# Patient Record
Sex: FEMALE | Race: WHITE | NOT HISPANIC OR LATINO | URBAN - METROPOLITAN AREA
[De-identification: names, ages, dates, MRNs, and addresses within clinical notes are randomized per-mention and may not be internally consistent; named-entity substitution may affect disease eponyms.]

---

## 2017-03-09 ENCOUNTER — AMBULATORY - HEALTHEAST (OUTPATIENT)
Dept: ALLERGY | Facility: CLINIC | Age: 72
End: 2017-03-09

## 2017-03-09 DIAGNOSIS — J30.9 ALLERGIC RHINITIS: ICD-10-CM

## 2017-03-20 ENCOUNTER — OFFICE VISIT - HEALTHEAST (OUTPATIENT)
Dept: ALLERGY | Facility: CLINIC | Age: 72
End: 2017-03-20

## 2017-03-20 DIAGNOSIS — J30.9 ALLERGIC RHINITIS, CAUSE UNSPECIFIED: ICD-10-CM

## 2017-03-20 DIAGNOSIS — J30.1 SEASONAL ALLERGIC RHINITIS DUE TO POLLEN: ICD-10-CM

## 2017-03-20 DIAGNOSIS — J30.81 ALLERGIC RHINITIS DUE TO ANIMAL (CAT) (DOG) HAIR AND DANDER: ICD-10-CM

## 2017-03-20 RX ORDER — OLOPATADINE HYDROCHLORIDE 1 MG/ML
SOLUTION/ DROPS OPHTHALMIC
Qty: 15 ML | Refills: 2 | Status: SHIPPED | OUTPATIENT
Start: 2017-03-20

## 2017-03-20 RX ORDER — AZELASTINE 1 MG/ML
2 SPRAY, METERED NASAL 2 TIMES DAILY
Qty: 90 ML | Refills: 3 | Status: SHIPPED | OUTPATIENT
Start: 2017-03-20

## 2017-03-20 RX ORDER — CETIRIZINE HYDROCHLORIDE 10 MG/1
10 TABLET ORAL DAILY PRN
Qty: 100 TABLET | Refills: 3 | Status: SHIPPED | OUTPATIENT
Start: 2017-03-20

## 2017-07-20 ENCOUNTER — AMBULATORY - HEALTHEAST (OUTPATIENT)
Dept: ALLERGY | Facility: CLINIC | Age: 72
End: 2017-07-20

## 2017-07-20 DIAGNOSIS — J30.81 ALLERGIC RHINITIS DUE TO ANIMAL (CAT) (DOG) HAIR AND DANDER: ICD-10-CM

## 2017-11-29 ENCOUNTER — AMBULATORY - HEALTHEAST (OUTPATIENT)
Dept: ALLERGY | Facility: CLINIC | Age: 72
End: 2017-11-29

## 2017-11-29 DIAGNOSIS — J30.81 ALLERGIC RHINITIS DUE TO ANIMAL HAIR AND DANDER: ICD-10-CM

## 2018-05-16 ENCOUNTER — AMBULATORY - HEALTHEAST (OUTPATIENT)
Dept: ALLERGY | Facility: CLINIC | Age: 73
End: 2018-05-16

## 2018-05-16 DIAGNOSIS — J30.2 CHRONIC SEASONAL ALLERGIC RHINITIS DUE TO OTHER ALLERGEN: ICD-10-CM

## 2018-05-30 ENCOUNTER — OFFICE VISIT - HEALTHEAST (OUTPATIENT)
Dept: ALLERGY | Facility: CLINIC | Age: 73
End: 2018-05-30

## 2018-10-04 ENCOUNTER — AMBULATORY - HEALTHEAST (OUTPATIENT)
Dept: ALLERGY | Facility: CLINIC | Age: 73
End: 2018-10-04

## 2018-10-04 DIAGNOSIS — J30.89 SEASONAL ALLERGIC RHINITIS DUE TO OTHER ALLERGIC TRIGGER: ICD-10-CM

## 2019-05-02 ENCOUNTER — AMBULATORY - HEALTHEAST (OUTPATIENT)
Dept: ALLERGY | Facility: CLINIC | Age: 74
End: 2019-05-02

## 2019-05-02 DIAGNOSIS — J30.89 SEASONAL ALLERGIC RHINITIS DUE TO OTHER ALLERGIC TRIGGER: ICD-10-CM

## 2019-05-06 ENCOUNTER — OFFICE VISIT - HEALTHEAST (OUTPATIENT)
Dept: ALLERGY | Facility: CLINIC | Age: 74
End: 2019-05-06

## 2019-05-06 ENCOUNTER — COMMUNICATION - HEALTHEAST (OUTPATIENT)
Dept: INTERNAL MEDICINE | Facility: CLINIC | Age: 74
End: 2019-05-06

## 2019-05-06 DIAGNOSIS — J30.89 SEASONAL ALLERGIC RHINITIS DUE TO OTHER ALLERGIC TRIGGER: ICD-10-CM

## 2020-01-28 ENCOUNTER — AMBULATORY - HEALTHEAST (OUTPATIENT)
Dept: ALLERGY | Facility: CLINIC | Age: 75
End: 2020-01-28

## 2020-01-28 DIAGNOSIS — J30.89 SEASONAL ALLERGIC RHINITIS DUE TO OTHER ALLERGIC TRIGGER: ICD-10-CM

## 2020-07-22 ENCOUNTER — COMMUNICATION - HEALTHEAST (OUTPATIENT)
Dept: ALLERGY | Facility: CLINIC | Age: 75
End: 2020-07-22

## 2021-05-28 NOTE — PROGRESS NOTES
Assessment:  Jazmín has allergic rhinitis with specific sensitivity to dust mite and mold  Nonallergic vasomotor rhinitis component.  On immunotherapy and tolerating well.  Rebound symptoms when trying to stretch out allergy shots.      Plan:  Continue immunotherapy prescription. 1:1 extract, 0.5 mL dosing.  Recommend trying to space out allergy shot as tolerated.  Nasacort nasal spray daily  Astelin 2 sprays daily.    Antihistamines when necessary  Follow-up annually if doing well sooner with new concerns  We discussed duration of allergy shots.  Patient will have reached 5 years this summer.  If she is unable to space out her allergy shot she can continue it and we can review at next year.  If she is interested she can trial stopping it.  I asked that she contact allergy clinic to let us know if she is stopping her shot.  ____________________________________________________________________________     Siena comes in today for follow-up of allergy shots.  Overall she reports that her allergy symptoms are under good control.  She continues to use Astelin, Nasacort and Zyrtec daily.  She also uses chlorpheniramine.  She has been getting her allergy shot regularly and has tolerated well without systemic reaction.  She reports that it she recalls having problems spacing her allergy shot out and rebound allergy symptoms.  Often x4 weeks.  She reports symptoms of nasal congestion, itchy watery eyes, rhinorrhea.    Physical Exam:  General:  Alert and Oriented.  Eyes:  Sclera clear. Nose: pale boggy mucosal membranes.  Throat:  pink moist, no lesions.  Lungs:  clear to auscultation. Skin:  no rashes    25 min spent in direct contact with the patient.  More than 50% in counseling regarding immunotherapy.  Duration of treatment.  Medication management.

## 2021-05-28 NOTE — TELEPHONE ENCOUNTER
"  Sending to Tequila as an FYI.  Pt Jazmín here to see  Kwaku. When I came out to my desk from lunch, she was at the front of my desk on her phone. She was talking to our billing department because the person covering me said she coud call that number. All I knew was that she was seeing Dr Ames, and not sure why billing was involved.  When I was done another pt before her, she pushed her insurance card in front of me and said \"I need my insurance  called to see if Dr Ames is in network\". I politely and professionally said, \"did you call them first?\" She looked down and said \"not this time\". I said \"well, it is the patient's responsibility to call their insurance.\" She kept saying that none of us (HE) knows about insurance and we don't know what we are doing. 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  She then called her insurance and complained to them she can't understand \"one person who is in their customer service\" and they need a new system. She then hung up.  They had told her he is in network.   Allergy was on their way out to get her in a room right after that.  "

## 2021-05-28 NOTE — PROGRESS NOTES
MM/DFM/DPM  1:1 V/V EXP 4/30/2020      [facility]   CHECKED BY ES  CHARGED 10 UNITS

## 2021-05-30 VITALS — HEIGHT: 62 IN | BODY MASS INDEX: 22.68 KG/M2

## 2021-06-09 NOTE — PROGRESS NOTES
Assessment:  Jazmín has allergic rhinitis with specific sensitivity to dust mite and mold  Nonallergic vasomotor rhinitis component.  On immunotherapy and tolerating well.     Plan:  Continue same immunotherapy prescription. 1:1 extract, 0.5 mL dosing every 2-4 weeks.  Nasacort nasal spray daily  Astelin 2 sprays daily.  This can be increased to twice daily with worsening symptoms.  Antihistamines when necessary  Follow-up annually if doing well sooner with new concerns  ____________________________________________________________________________                                                             Jazmín reports that she is doing well.  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  She no longer has a cat in the home.  She has been tolerating allergy shots well.  She was restarted on immunotherapy in 2014.  She feels that it is beneficial.  She reports return of significant symptoms if waiting more than 3 weeks before getting her shot.  No report of systemic reaction to her allergy shot.    Physical Exam:  General:  Alert and oriented.  Eyes:  Sclera clear. Nose: pale boggy mucosal membranes.  Throat:  pink moist, no lesions.  Lungs:  clear to auscultation  This transcription uses voice recognition software, which may contain typographical errors.

## 2021-06-09 NOTE — TELEPHONE ENCOUNTER
Dr. Prado    This person called and is requesting a call back:    Name Of Person Who Called: Jazmín     Why Did The Person Call: Dr. Ames Patient called with many questions about establishing care with Dr. Prado. Main question she is insisting on is were  her serum is from for her injections. She wants to know the company name.     Best Phone Number To Call Back: [phone]    Okay To Leave A Detailed Voicemail? Yes    Thank you.

## 2021-06-10 NOTE — TELEPHONE ENCOUNTER
Patient called back. She said she never got the form. However she got it emailed to her and needs an email address to send it back. She said that Maida told her she could email it to her. She needs the [employer] to send the serum information to Dr Ames' new office.     Please leave email address on  after 10 rings. He phone rings 10 times before it goes to .

## 2021-06-10 NOTE — TELEPHONE ENCOUNTER
"Spoke to patient- pt expressed her displeasure multiple times that she has not received a release of information form from us that was placed in the mail last Friday.  Patient then stated \"I've spent four days of my own time trying to track down this form.  I finally called medical records and they emailed me the form.\"  Patient was requesting our email to return the form- I advised pt to either mail, email, or fax the completed form to the addresses on the bottom of the second page of the form.  Pt was very argumentative and kept interrupting me when I was trying to direct her where to send the form.  Pt eventually just said \"thank you\" and hung up.   "

## 2021-06-10 NOTE — TELEPHONE ENCOUNTER
Radha called from [facility] with questions about the records they received. Patient is going to start getting allergy shots at [facility] and they need the mixing recipe in order to mix her serum at [facility]. Can call her direct number at [phone]. She does not have a voicemail so if she does not answer it will go back to a call center.

## 2021-06-10 NOTE — TELEPHONE ENCOUNTER
Spoke to [facility]- serum recipe and last inventory faxed. (release of information is in patient's chart)

## 2021-06-18 NOTE — PROGRESS NOTES
Assessment:  Jazmín has allergic rhinitis with specific sensitivity to dust mite and mold  Nonallergic vasomotor rhinitis component.  On immunotherapy and tolerating well.      Plan:  Continue same immunotherapy prescription. 1:1 extract, 0.5 mL dosing every 2-4 weeks.  Nasacort nasal spray daily  Astelin 2 sprays daily.    Antihistamines when necessary  Follow-up annually if doing well sooner with new concerns  Plan on completing 5 years of allergy shots.  She has been on full dose allergy shots since summer 2014.  ____________________________________________________________________________     She returns today for annual follow-up of allergy shots.  She reports that she is tolerating well without a systemic reaction.  She does have occasional breakthrough allergy symptoms including sneeze, headache and congestion.  Overall she feels that she is much improved on allergy shots.  She has been on allergy shots previously.  She has a rebound when stopping shots.  She was last restarted on allergy shots in 2013 reaching full maintenance spring/summer 2014.    Physical Exam:  General:  Alert and Oriented.  Eyes:  Sclera clear. Nose: pale boggy mucosal membranes.  Throat:  pink moist, no lesions.  Lungs:  clear to auscultation. Skin:  no rashes